# Patient Record
Sex: FEMALE | Race: WHITE
[De-identification: names, ages, dates, MRNs, and addresses within clinical notes are randomized per-mention and may not be internally consistent; named-entity substitution may affect disease eponyms.]

---

## 2019-06-21 ENCOUNTER — HOSPITAL ENCOUNTER (EMERGENCY)
Dept: HOSPITAL 8 - ED | Age: 54
End: 2019-06-21
Payer: COMMERCIAL

## 2019-06-21 VITALS — HEIGHT: 67 IN | BODY MASS INDEX: 33.01 KG/M2 | WEIGHT: 210.32 LBS

## 2019-06-21 VITALS — DIASTOLIC BLOOD PRESSURE: 63 MMHG | SYSTOLIC BLOOD PRESSURE: 147 MMHG

## 2019-06-21 DIAGNOSIS — T78.40XA: Primary | ICD-10-CM
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
## 2019-06-21 PROCEDURE — 93005 ELECTROCARDIOGRAM TRACING: CPT

## 2019-06-21 PROCEDURE — 99284 EMERGENCY DEPT VISIT MOD MDM: CPT

## 2019-06-21 NOTE — NUR
pt resting in gurney, some relief of itching and swelling after medications. 
 at bedside, call light within reach

## 2019-06-21 NOTE — NUR
break coverage:  assumed care of pt on behalf of primary RN for lunch break 
only.  pt sitting up on gurney in no apparent distress.  talking to family at 
bedside